# Patient Record
Sex: MALE | Race: BLACK OR AFRICAN AMERICAN | NOT HISPANIC OR LATINO
[De-identification: names, ages, dates, MRNs, and addresses within clinical notes are randomized per-mention and may not be internally consistent; named-entity substitution may affect disease eponyms.]

---

## 2023-07-31 PROBLEM — Z00.00 ENCOUNTER FOR PREVENTIVE HEALTH EXAMINATION: Status: ACTIVE | Noted: 2023-07-31

## 2024-05-17 RX ORDER — METOPROLOL TARTRATE 25 MG/1
25 TABLET, FILM COATED ORAL
Refills: 0 | Status: ACTIVE | COMMUNITY

## 2024-05-17 RX ORDER — LOSARTAN POTASSIUM 50 MG/1
50 TABLET, FILM COATED ORAL
Refills: 0 | Status: ACTIVE | COMMUNITY

## 2024-05-17 RX ORDER — CETIRIZINE HYDROCHLORIDE 5 MG/1
5 TABLET ORAL
Refills: 0 | Status: ACTIVE | COMMUNITY

## 2024-05-17 RX ORDER — AMLODIPINE BESYLATE 10 MG/1
10 TABLET ORAL
Refills: 0 | Status: ACTIVE | COMMUNITY

## 2024-05-17 RX ORDER — ALBUTEROL 90 MCG
90 AEROSOL (GRAM) INHALATION
Refills: 0 | Status: ACTIVE | COMMUNITY

## 2024-05-17 RX ORDER — PANTOPRAZOLE SODIUM 40 MG/1
40 GRANULE, DELAYED RELEASE ORAL
Refills: 0 | Status: ACTIVE | COMMUNITY

## 2024-05-17 RX ORDER — MAGNESIUM OXIDE/MAG AA CHELATE 300 MG
CAPSULE ORAL
Refills: 0 | Status: ACTIVE | COMMUNITY

## 2024-05-22 ENCOUNTER — APPOINTMENT (OUTPATIENT)
Dept: NEUROLOGY | Facility: CLINIC | Age: 54
End: 2024-05-22
Payer: COMMERCIAL

## 2024-05-22 VITALS
WEIGHT: 247 LBS | HEART RATE: 99 BPM | HEIGHT: 66 IN | DIASTOLIC BLOOD PRESSURE: 97 MMHG | SYSTOLIC BLOOD PRESSURE: 147 MMHG | BODY MASS INDEX: 39.7 KG/M2 | OXYGEN SATURATION: 96 %

## 2024-05-22 DIAGNOSIS — Z78.9 OTHER SPECIFIED HEALTH STATUS: ICD-10-CM

## 2024-05-22 DIAGNOSIS — Z86.79 PERSONAL HISTORY OF OTHER DISEASES OF THE CIRCULATORY SYSTEM: ICD-10-CM

## 2024-05-22 DIAGNOSIS — E61.2 MAGNESIUM DEFICIENCY: ICD-10-CM

## 2024-05-22 DIAGNOSIS — K22.719 BARRETT'S ESOPHAGUS WITH DYSPLASIA, UNSPECIFIED: ICD-10-CM

## 2024-05-22 DIAGNOSIS — R73.03 PREDIABETES.: ICD-10-CM

## 2024-05-22 DIAGNOSIS — R56.9 UNSPECIFIED CONVULSIONS: ICD-10-CM

## 2024-05-22 DIAGNOSIS — R55 SYNCOPE AND COLLAPSE: ICD-10-CM

## 2024-05-22 DIAGNOSIS — R40.20 UNSPECIFIED COMA: ICD-10-CM

## 2024-05-22 DIAGNOSIS — R06.89 OTHER ABNORMALITIES OF BREATHING: ICD-10-CM

## 2024-05-22 DIAGNOSIS — R40.0 SOMNOLENCE: ICD-10-CM

## 2024-05-22 DIAGNOSIS — Z86.39 PERSONAL HISTORY OF OTHER ENDOCRINE, NUTRITIONAL AND METABOLIC DISEASE: ICD-10-CM

## 2024-05-22 DIAGNOSIS — J45.20 MILD INTERMITTENT ASTHMA, UNCOMPLICATED: ICD-10-CM

## 2024-05-22 DIAGNOSIS — R06.83 SNORING: ICD-10-CM

## 2024-05-22 PROCEDURE — G2211 COMPLEX E/M VISIT ADD ON: CPT | Mod: NC,1L

## 2024-05-22 PROCEDURE — 99203 OFFICE O/P NEW LOW 30 MIN: CPT

## 2024-05-22 RX ORDER — MULTIVIT-MIN/IRON/FOLIC ACID/K 18-600-40
400 CAPSULE ORAL
Refills: 0 | Status: ACTIVE | COMMUNITY

## 2024-05-22 RX ORDER — UBIDECARENONE 200 MG
200 CAPSULE ORAL
Refills: 0 | Status: ACTIVE | COMMUNITY

## 2024-05-22 NOTE — HISTORY OF PRESENT ILLNESS
[FreeTextEntry1] : 53-year-old male right-hand-dominant presents with wife to clinic for complaints of post hospization [facility] August 2023. Patient was diagnosed with TIA and released imaging completed after imaging with CT and neck, MRI brain and CT perfusion test.  Per wife patient collapsed with loss of consciousness with facial asymmetry, fixation of eyes without laterality and head rigidity.  Wife denied any history of slurred speech convulsive activity tongue trauma drooling or loss of bladder or bowel function.  Patient followed up with outpatient [place] neurology where a routine EEG was completed results not available at this time.  A second event similar to prior occurred on May 5, 2024, patient was seen at [facility] was preceded by what was described as a GI viral event with nausea and vomiting resulting in loss of consciousness and convulsing as per wife and no imaging is completed. Patient states seeing cardiologist MR in chart and reviewed.  Past medical history positive for pre-/DM type II, asthma hypomagnesia, obesity (40-44.9 BMI) mitral valve regurgitation, Dukes's esophagus hypertension seasonal allergies.  Patient states no surgical history.  Patient states no known drug allergies.  Current medications albuterol, amlodipine, sertraline, losartan, and magnesium metoprolol cramps possible co-Q10 and multivitamin.  Social history positive for caffeine negative for alcohol, tobacco illicit drugs/marijuana or vaping.  Patient is  and both  and wife.  No significant family history.  MRI brain DWI flair only 8/14/2023: There is no evidence of restricted diffusion to indicate acute infarct.  The ventricles cisterns and sulci are age-appropriate in size.  There is no mass effect midline shift.  There is no intracranial hemorrhage.  Impression no acute infarct.  CTA Neck with and without IV contrast 8/13/23: Impression CTA of neck no stenosis or definitive dissection.  CT perfusion 8/13/23: Impression: No perfusion abnormalities suggest acute infarct.

## 2024-05-22 NOTE — PHYSICAL EXAM
[General Appearance - Alert] : alert [General Appearance - In No Acute Distress] : in no acute distress [General Appearance - Well Nourished] : well nourished [General Appearance - Well Developed] : well developed [Oriented To Time, Place, And Person] : oriented to person, place, and time [Affect] : the affect was normal [Mood] : the mood was normal [Cranial Nerves Optic (II)] : visual acuity intact bilaterally,  visual fields full to confrontation, pupils equal round and reactive to light [Cranial Nerves Oculomotor (III)] : extraocular motion intact [Cranial Nerves Trigeminal (V)] : facial sensation intact symmetrically [Cranial Nerves Facial (VII)] : face symmetrical [Cranial Nerves Vestibulocochlear (VIII)] : hearing was intact bilaterally [Cranial Nerves Glossopharyngeal (IX)] : tongue and palate midline [Cranial Nerves Accessory (XI - Cranial And Spinal)] : head turning and shoulder shrug symmetric [Cranial Nerves Hypoglossal (XII)] : there was no tongue deviation with protrusion [Motor Tone] : muscle tone was normal in all four extremities [Motor Strength] : muscle strength was normal in all four extremities [Involuntary Movements] : no involuntary movements were seen [No Muscle Atrophy] : normal bulk in all four extremities [Motor Handedness Right-Handed] : the patient is right hand dominant [Sensation Vibration Decrease] : vibration was intact [Proprioception] : proprioception was intact [Tactile Decrease Entire Right Arm] : diminished right arm [Tactile Decrease Entire Right Side Of Face] : diminished right side of the face [Tactile Decrease Entire Leg Left] : diminished left leg [Abnormal Walk] : normal gait [Balance] : balance was intact [2+] : Ankle jerk left 2+ [PERRL With Normal Accommodation] : pupils were equal in size, round, reactive to light, with normal accommodation [Extraocular Movements] : extraocular movements were intact [Hearing Threshold Finger Rub Not [place]] : hearing was normal [Neck Appearance] : the appearance of the neck was normal [Neck Cervical Mass (___cm)] : no neck mass was observed [Exaggerated Use Of Accessory Muscles For Inspiration] : no accessory muscle use [Auscultation Breath Sounds / Voice Sounds] : lungs were clear to auscultation bilaterally [Heart Rate And Rhythm] : heart rate was normal and rhythm regular [Heart Sounds] : normal S1 and S2 [Murmurs] : no murmurs [Abdomen Soft] : soft [Abdomen Tenderness] : non-tender [Nail Clubbing] : no clubbing  or cyanosis of the fingernails [Skin Color & Pigmentation] : normal skin color and pigmentation [Skin Turgor] : normal skin turgor [] : no rash [Skin Lesions] : no skin lesions [FreeTextEntry1] : BMI 44.0-44.9% [Paresis Pronator Drift Right-Sided] : no pronator drift on the right [Paresis Pronator Drift Left-Sided] : no pronator drift on the left [Motor Strength Upper Extremities Bilaterally] : strength was normal in both upper extremities [Motor Strength Lower Extremities Bilaterally] : strength was normal in both lower extremities [Romberg's Sign] : Romberg's sign was negtive [Tactile Decrease Shoulders Right] : normal right shoulder [Tactile Decrease Shoulders Left] : normal left shoulder [Tactile Decrease Entire Left Arm] : normal left arm [Tactile Decrease Hand] : normal right hand [Tactile Decrease Neck Right Side] : normal right side of the neck [Tactile Decrease Neck Left Side] : normal left side of the neck [Tactile Decrease Entire Leg Right] : normal right leg [Tremor] : no tremor present

## 2024-05-22 NOTE — REVIEW OF SYSTEMS
[Feeling Tired] : feeling tired [Sleep Disturbances] : sleep disturbances [Dizziness] : dizziness [Fainting] : fainting [Lightheadedness] : lightheadedness [Shortness Of Breath] : shortness of breath [SOB on Exertion] : shortness of breath during exertion [Fever] : no fever [Chills] : no chills [Anxiety] : no anxiety [Depression] : no depression [Confused or Disoriented] : no confusion [Memory Lapses or Loss] : no memory loss [Decr. Concentrating Ability] : no decrease in concentrating ability [Difficulty with Language] : no ~M difficulty with language [Facial Weakness] : no facial weakness [Arm Weakness] : no arm weakness [Hand Weakness] : no hand weakness [Leg Weakness] : no leg weakness [Numbness] : no numbness [Tingling] : no tingling [Seizures] : no convulsions [Vertigo] : no vertigo [Migraine Headache] : no migraine headache [Difficulty Walking] : no difficulty walking [Inability to Walk] : able to walk [Eye Pain] : no eye pain [Loss Of Hearing] : no hearing loss [Chest Pain] : no chest pain [Palpitations] : no palpitations [Abdominal Pain] : no abdominal pain [Dysuria] : no dysuria [Arthralgias] : no arthralgias [Joint Pain] : no joint pain [Skin Lesions] : no skin lesions [Skin Wound] : no skin wound [Easy Bleeding] : no tendency for easy bleeding [Easy Bruising] : no tendency for easy bruising

## 2024-05-22 NOTE — DATA REVIEWED
[de-identified] : MRI brain DWI flair only 8/14/2023: There is no evidence of restricted diffusion to indicate acute infarct.  The ventricles cisterns and sulci are age-appropriate in size.  There is no mass effect midline shift.  There is no intracranial hemorrhage.  Impression no acute infarct. [de-identified] : CTA Neck with and without IV contrast 8/13/23: Evaluation of visualized portion of the and large and its major branches demonstrates normal anatomic configuration, contour and caliber without stenosis or dissection.  There is good opacification of the common carotid arteries.  There is plaque in some with calcification at the proximal internal carotid arteries bilaterally however there is no hemodynamically significant stenosis is noted.  Good opacification of the internal carotid arteries is seen distally to the skull base.  There are normal-appearing bilateral cervical vertebral arteries.  The right vertebral artery is dominant.  There is no stenosis or definite dissection.  Incidental findings spondylitic changes of the cervical spine most prominent at C5-6 and C6-7.  Impression CTA of neck no stenosis or definitive dissection.  CT perfusion 8/13/23: There is no perfusion abnormality to suggest acute infarct.  CBS is less than 30% with 0 mL, Tmax greater than 6 sec with 0 mL, mismatch volume 0 mL and mismatch ratio none. Impression: No perfusion abnormalities suggest acute infarct.

## 2024-05-22 NOTE — DISCUSSION/SUMMARY
[FreeTextEntry1] : 53-year-old male right-hand-dominant presents with wife for 2 episodes of collapse with loss of consciousness and seizure-like activity following TIA August 2023.  This is concerning for seizures.  My plan is as follows:  1.  Request CTA head with and without IV contrast to evaluate for cerebral stenosis, occlusion aneurysms and AVMs and other structural concerns.  2.  Request 24-hour ambulatory EEG and routine EEG (61 minutes) for evaluation of focal activity.  3.  Request take home sleep test to evaluate for KELECHI with history of snoring gasping and daytime drowsiness.  4.  Return to clinic in 4 to 6 weeks for follow-up evaluation

## 2024-05-30 ENCOUNTER — APPOINTMENT (OUTPATIENT)
Dept: NEUROLOGY | Facility: CLINIC | Age: 54
End: 2024-05-30
Payer: COMMERCIAL

## 2024-05-30 PROCEDURE — 95819 EEG AWAKE AND ASLEEP: CPT

## 2024-05-30 PROCEDURE — 93040 RHYTHM ECG WITH REPORT: CPT

## 2024-05-31 PROCEDURE — 95708 EEG WO VID EA 12-26HR UNMNTR: CPT

## 2024-05-31 PROCEDURE — 95719 EEG PHYS/QHP EA INCR W/O VID: CPT

## 2024-05-31 PROCEDURE — 95700 EEG CONT REC W/VID EEG TECH: CPT

## 2024-06-19 ENCOUNTER — APPOINTMENT (OUTPATIENT)
Dept: CT IMAGING | Facility: CLINIC | Age: 54
End: 2024-06-19
Payer: COMMERCIAL

## 2024-06-19 PROCEDURE — 70496 CT ANGIOGRAPHY HEAD: CPT

## 2024-07-03 ENCOUNTER — APPOINTMENT (OUTPATIENT)
Dept: NEUROLOGY | Facility: CLINIC | Age: 54
End: 2024-07-03
Payer: COMMERCIAL

## 2024-07-03 VITALS
HEIGHT: 66 IN | DIASTOLIC BLOOD PRESSURE: 84 MMHG | SYSTOLIC BLOOD PRESSURE: 122 MMHG | WEIGHT: 247 LBS | HEART RATE: 96 BPM | BODY MASS INDEX: 39.7 KG/M2 | OXYGEN SATURATION: 97 %

## 2024-07-03 DIAGNOSIS — R55 SYNCOPE AND COLLAPSE: ICD-10-CM

## 2024-07-03 DIAGNOSIS — R40.20 UNSPECIFIED COMA: ICD-10-CM

## 2024-07-03 DIAGNOSIS — R56.9 UNSPECIFIED CONVULSIONS: ICD-10-CM

## 2024-07-03 PROCEDURE — G2211 COMPLEX E/M VISIT ADD ON: CPT | Mod: NC

## 2024-07-03 PROCEDURE — 99213 OFFICE O/P EST LOW 20 MIN: CPT

## 2024-08-21 ENCOUNTER — APPOINTMENT (OUTPATIENT)
Dept: NEUROLOGY | Facility: CLINIC | Age: 54
End: 2024-08-21
Payer: COMMERCIAL

## 2024-08-21 VITALS
SYSTOLIC BLOOD PRESSURE: 120 MMHG | OXYGEN SATURATION: 97 % | DIASTOLIC BLOOD PRESSURE: 83 MMHG | HEART RATE: 91 BPM | HEIGHT: 66 IN | WEIGHT: 247 LBS | BODY MASS INDEX: 39.7 KG/M2

## 2024-08-21 DIAGNOSIS — R56.9 UNSPECIFIED CONVULSIONS: ICD-10-CM

## 2024-08-21 DIAGNOSIS — Z86.73 PERSONAL HISTORY OF TRANSIENT ISCHEMIC ATTACK (TIA), AND CEREBRAL INFARCTION W/OUT RESIDUAL DEFICITS: ICD-10-CM

## 2024-08-21 PROCEDURE — 99214 OFFICE O/P EST MOD 30 MIN: CPT

## 2024-08-21 NOTE — HISTORY OF PRESENT ILLNESS
[FreeTextEntry1] : 53-year-old male presenting with wife for evaluation of seizure-like events referred by recently departed HARISH Coronado. Clinical history given in prior notes reviewed with patient. August 2023 event at [place] concerning for most likely TIA with LOC associated with facial palsy but no convulsive activity. MRI was negative for acute stroke and patient was not recommended to start aspirin for unclear reason. Then patient had another event on May 5, 2024 where he was having nausea for several hours then went to the bathroom diaphoretic vomiting profusely followed by passing out on the toilet bowl and watery explosive diarrhea witnessed by wife who also noted some mild shakes while he was passed out for a few minutes' seconds.  Patient was taken to [facility] where it was deemed that he likely had convulsive syncope from dehydration and acute GI viral illness. Patient is following with a cardiologist has got an echo and his blood pressure medicines have been adjusted.  Since last visit With HARISH Coronado on July 3, 2024, patient and wife deny any recurrent seizure-like events, LOC. Patient also completed 48-hour ambulatory EEG dated May 30, 2024, which was within normal limits. Limited MRI from August 14, 2024, with FLAIR and DWI imaging revealed no acute stroke or acute cortical abnormality.  Further questioning reveals excessive daytime somnolence fatigue, waking up not well rested, loud snoring intermittently at night, poor sleep quality which are all very concerning signs of undiagnosed obstructive sleep apnea.   -------------------------------------------- <<< *** BACKGROUND *** >>> -------------------------------------------- 53-year-old male right-hand-dominant presents with wife to clinic for complaints of post hospitalization [facility] August 2023. Patient was diagnosed with TIA and released imaging completed after imaging with CT and neck, MRI brain and CT perfusion test. Per wife patient collapsed with loss of consciousness with facial asymmetry, fixation of eyes without laterality and head rigidity. Wife denied any history of slurred speech convulsive activity tongue trauma drooling or loss of bladder or bowel function. Patient followed up with outpatient [place] neurology where a routine EEG was completed results not available at this time. A second event similar to prior occurred on May 5, 2024, patient was seen at [facility] was preceded by what was described as a GI viral event with nausea and vomiting resulting in loss of consciousness and convulsing as per wife and no imaging is completed. Patient states seeing cardiologist MR in chart and reviewed.  Past medical history positive for pre-/DM type II, asthma hypomagnesia, morbid obesity (40-44.9 BMI) mitral valve regurgitation, Dukes's esophagus hypertension seasonal allergies. Patient states no surgical history. Patient states no known drug allergies. Current medications albuterol, amlodipine, sertraline, losartan, and magnesium metoprolol cramps possible co-Q10 and multivitamin. Social history positive for caffeine negative for alcohol, tobacco illicit drugs/marijuana or vaping. Patient is  and both  and wife. No significant family history.  MRI brain DWI flair only 8/14/2023: There is no evidence of restricted diffusion to indicate acute infarct. The ventricles cisterns and sulci are age-appropriate in size. There is no mass effect midline shift. There is no intracranial hemorrhage. Impression no acute infarct. CTA Neck with and without IV contrast 8/13/23: Impression CTA of neck no stenosis or definitive dissection. CT perfusion 8/13/23: Impression: No perfusion abnormalities suggest acute infarct.

## 2024-08-21 NOTE — ASSESSMENT
[FreeTextEntry1] : 53-year-old male being referred to me by HARISH Coronado for possible seizure-like events.  Clinical history and events discussed with patient and wife.  First event in August 2023 with diagnosis of possible TIA with facial asymmetry is probably consistent with TIA however patient was not on any aspirin.  Event of May 2024 seems most consistent with convulsive syncope in the context of acute GI illness associated with profuse diarrhea and vomiting.  Overall the most significant clinical concern for me at this time is likely patient has undiagnosed at least severe obstructive sleep apnea on account of morbid obesity hypertension excessive daytime somnolence fatigue brain fog and loud snoring. This is likely also leading to hypertension requiring multiple meds as well as increasing risk of stroke and MI which patient was extensively educated on.  Recent AEEG 48 hours was WNL.  RECCS: - Sleep study and medicine referral - recc Dr. Héctor Rich or other [facility] provider - jenna - NO DRIVING at this time due to excessive somnolence. - start ASA 81 for stroke ppx qd  - f/u with PCP  At this time I do not anticipate further neurological workup barring any change in circumstances or new/recurrent neurological concerns.  However, it would be reasonable to follow-up with neurology PA at 4 months time to repeat reassess all of the above.  Extensive education and counseling done as per my usual protocol - relevant to the neurological issues above. Patient's and wife's questions and concerns were addressed, they voiced understanding.  Total time spent on the day of the visit, including pre-visit and post-visit time was 40 minutes.

## 2024-12-19 ENCOUNTER — APPOINTMENT (OUTPATIENT)
Dept: NEUROLOGY | Facility: CLINIC | Age: 54
End: 2024-12-19

## 2024-12-23 ENCOUNTER — APPOINTMENT (OUTPATIENT)
Dept: NEUROLOGY | Facility: CLINIC | Age: 54
End: 2024-12-23
Payer: COMMERCIAL

## 2024-12-23 VITALS
DIASTOLIC BLOOD PRESSURE: 86 MMHG | HEART RATE: 87 BPM | BODY MASS INDEX: 39.7 KG/M2 | SYSTOLIC BLOOD PRESSURE: 124 MMHG | WEIGHT: 247 LBS | HEIGHT: 66 IN | OXYGEN SATURATION: 98 %

## 2024-12-23 DIAGNOSIS — R06.83 SNORING: ICD-10-CM

## 2024-12-23 DIAGNOSIS — R56.9 UNSPECIFIED CONVULSIONS: ICD-10-CM

## 2024-12-23 PROCEDURE — 99215 OFFICE O/P EST HI 40 MIN: CPT

## 2025-01-08 ENCOUNTER — APPOINTMENT (OUTPATIENT)
Dept: NEUROLOGY | Facility: CLINIC | Age: 55
End: 2025-01-08

## 2025-01-24 ENCOUNTER — APPOINTMENT (OUTPATIENT)
Dept: NEUROLOGY | Facility: CLINIC | Age: 55
End: 2025-01-24
Payer: COMMERCIAL

## 2025-01-24 PROCEDURE — 93040 RHYTHM ECG WITH REPORT: CPT

## 2025-01-24 PROCEDURE — 95816 EEG AWAKE AND DROWSY: CPT

## 2025-01-25 PROCEDURE — 95719 EEG PHYS/QHP EA INCR W/O VID: CPT

## 2025-01-25 PROCEDURE — 95708 EEG WO VID EA 12-26HR UNMNTR: CPT

## 2025-01-25 PROCEDURE — 95700 EEG CONT REC W/VID EEG TECH: CPT

## 2025-01-27 ENCOUNTER — APPOINTMENT (OUTPATIENT)
Dept: NEUROLOGY | Facility: CLINIC | Age: 55
End: 2025-01-27

## 2025-03-31 ENCOUNTER — NON-APPOINTMENT (OUTPATIENT)
Age: 55
End: 2025-03-31

## 2025-04-01 ENCOUNTER — APPOINTMENT (OUTPATIENT)
Dept: NEUROLOGY | Facility: CLINIC | Age: 55
End: 2025-04-01